# Patient Record
Sex: MALE | Race: WHITE | Employment: FULL TIME
[De-identification: names, ages, dates, MRNs, and addresses within clinical notes are randomized per-mention and may not be internally consistent; named-entity substitution may affect disease eponyms.]

---

## 2022-09-26 ASSESSMENT — ENCOUNTER SYMPTOMS
NAUSEA: 0
SHORTNESS OF BREATH: 0
VOMITING: 0
ABDOMINAL PAIN: 0
COUGH: 0
DIARRHEA: 0

## 2022-09-26 NOTE — PROGRESS NOTES
primary care provider. Notes a history of high blood pressure for the last five years. States that he has not been on medicine for the last two years. States that he previously took Lisinopril but states that it didn't work well for him. Also used to take Fluoxetine for PTSD. Would like to restart the Fluoxetine as it helped him a lot. Does not want to restart the Prazosin that was also prescribed at the time. Notes some chronic pain in his low back. States that a few years ago he picked up something at work when he heard a snapping sound and had instant pain. States that he developed numbness and tingling and lost the ability to stand. States that he went to an internal medicine physician, then an orthopedic, and was told that he crushed the disc in his low back. States that it improved with ice and heat and he was able to walk so did not pursue surgery at the time. Notes daily pain in his low back. Takes Ibuprofen for pain if needed. Notes pain in his right leg with prolonged walking. States that he has left-sided numbness after a hard fall back in 2015. States that he rates his low back pain at 3/10 right now. Can see Dr. Negrito Zhu if pain gets worse as he is a family friend. Also notes that he was previously diagnosed with type II diabetes. Has never taken medication for it before and would like to try continuous glucose monitoring if necessary. Is not fasting today but agrees to come next week to get labs drawn.       PAST MEDICAL HISTORY    Past Medical History:   Diagnosis Date    ADHD (attention deficit hyperactivity disorder)     Anxiety     BP (high blood pressure)     PTSD (post-traumatic stress disorder) 01/28/2019       PAST SURGICAL HISTORY    Past Surgical History:   Procedure Laterality Date    ADENOIDECTOMY  1995    TONSILLECTOMY      2000    TYMPANOSTOMY TUBE PLACEMENT Bilateral 1995       FAMILY HISTORY    Family History   Problem Relation Age of Onset    Heart Failure Mother     Diabetes type 2  Mother     Hypertension Mother     Bipolar Disorder Mother     Alcohol Abuse Father     Arthritis Father     COPD Father     Depression Father     Hearing Loss Father        SOCIAL HISTORY    Social History     Socioeconomic History    Marital status:      Spouse name: None    Number of children: None    Years of education: None    Highest education level: None   Tobacco Use    Smoking status: Never    Smokeless tobacco: Never   Substance and Sexual Activity    Drug use: Never    Sexual activity: Yes     Partners: Female       MEDICATIONS      Current Outpatient Medications:     FLUoxetine (PROZAC) 20 MG capsule, Take 1 capsule by mouth daily, Disp: 90 capsule, Rfl: 5    ALLERGIES / INTOLERANCES    No Known Allergies    REVIEW OF SYSTEMS    Review of Systems   Constitutional:  Negative for fever. HENT:  Negative for congestion. Respiratory:  Negative for cough and shortness of breath. Cardiovascular:  Negative for chest pain. Gastrointestinal:  Negative for abdominal pain, diarrhea, nausea and vomiting. Psychiatric/Behavioral:  Negative for dysphoric mood. PHYSICAL EXAMINATION    Vitals:    09/28/22 1355   BP: (!) 140/78   Pulse: 90   Resp: 16   SpO2: 99%       Physical Exam  Vitals and nursing note reviewed. Constitutional:       Appearance: Normal appearance. He is obese. HENT:      Head: Normocephalic and atraumatic. Right Ear: External ear normal.      Left Ear: External ear normal.      Nose: Nose normal.      Mouth/Throat:      Mouth: Mucous membranes are moist.   Eyes:      Extraocular Movements: Extraocular movements intact. Cardiovascular:      Rate and Rhythm: Normal rate and regular rhythm. Heart sounds: Normal heart sounds. No murmur heard. Pulmonary:      Effort: Pulmonary effort is normal. No respiratory distress. Breath sounds: Normal breath sounds.    Abdominal:      General: Bowel sounds are normal.      Palpations: Abdomen is soft. Tenderness: There is no abdominal tenderness. There is no right CVA tenderness or left CVA tenderness. Musculoskeletal:         General: Normal range of motion. Cervical back: Normal range of motion. Skin:     General: Skin is warm. Neurological:      General: No focal deficit present. Mental Status: He is alert.    Psychiatric:         Mood and Affect: Mood normal.         Behavior: Behavior normal.       PERTINENT LABS AND IMAGING    Hemoglobin A1C :  10.6 H (3/05/2021)    Toby Santamaria DO  3:45 PM  09/28/22

## 2022-09-28 ENCOUNTER — OFFICE VISIT (OUTPATIENT)
Dept: PRIMARY CARE CLINIC | Facility: CLINIC | Age: 27
End: 2022-09-28
Payer: COMMERCIAL

## 2022-09-28 VITALS
SYSTOLIC BLOOD PRESSURE: 140 MMHG | RESPIRATION RATE: 16 BRPM | HEIGHT: 72 IN | DIASTOLIC BLOOD PRESSURE: 78 MMHG | OXYGEN SATURATION: 99 % | WEIGHT: 284.5 LBS | BODY MASS INDEX: 38.54 KG/M2 | HEART RATE: 90 BPM

## 2022-09-28 DIAGNOSIS — Z76.89 ENCOUNTER TO ESTABLISH CARE WITH NEW DOCTOR: Primary | ICD-10-CM

## 2022-09-28 DIAGNOSIS — E11.9 TYPE 2 DIABETES MELLITUS WITHOUT COMPLICATION, WITHOUT LONG-TERM CURRENT USE OF INSULIN (HCC): ICD-10-CM

## 2022-09-28 DIAGNOSIS — Z13.29 SCREENING FOR THYROID DISORDER: ICD-10-CM

## 2022-09-28 DIAGNOSIS — F43.10 PTSD (POST-TRAUMATIC STRESS DISORDER): ICD-10-CM

## 2022-09-28 DIAGNOSIS — Z13.220 SCREENING FOR LIPID DISORDERS: ICD-10-CM

## 2022-09-28 DIAGNOSIS — I10 ELEVATED BLOOD PRESSURE READING IN OFFICE WITH DIAGNOSIS OF HYPERTENSION: ICD-10-CM

## 2022-09-28 PROBLEM — F33.2 MAJOR DEPRESSIVE DISORDER, RECURRENT SEVERE WITHOUT PSYCHOTIC FEATURES (HCC): Status: ACTIVE | Noted: 2019-01-29

## 2022-09-28 PROBLEM — R45.851 SUICIDAL IDEATION: Status: ACTIVE | Noted: 2019-01-28

## 2022-09-28 PROBLEM — R03.0 ELEVATED BLOOD PRESSURE READING: Status: ACTIVE | Noted: 2022-09-28

## 2022-09-28 PROCEDURE — 99204 OFFICE O/P NEW MOD 45 MIN: CPT | Performed by: FAMILY MEDICINE

## 2022-09-28 RX ORDER — FLUOXETINE HYDROCHLORIDE 20 MG/1
20 CAPSULE ORAL DAILY
COMMUNITY
Start: 2019-02-04 | End: 2022-09-28

## 2022-09-28 RX ORDER — FLUOXETINE HYDROCHLORIDE 20 MG/1
20 CAPSULE ORAL DAILY
Qty: 90 CAPSULE | Refills: 5 | Status: SHIPPED | OUTPATIENT
Start: 2022-09-28

## 2022-09-28 RX ORDER — LISINOPRIL 20 MG/1
20 TABLET ORAL DAILY
COMMUNITY
Start: 2019-02-04 | End: 2022-09-28 | Stop reason: ALTCHOICE

## 2022-09-28 ASSESSMENT — ANXIETY QUESTIONNAIRES
5. BEING SO RESTLESS THAT IT IS HARD TO SIT STILL: 0
3. WORRYING TOO MUCH ABOUT DIFFERENT THINGS: 0
IF YOU CHECKED OFF ANY PROBLEMS ON THIS QUESTIONNAIRE, HOW DIFFICULT HAVE THESE PROBLEMS MADE IT FOR YOU TO DO YOUR WORK, TAKE CARE OF THINGS AT HOME, OR GET ALONG WITH OTHER PEOPLE: NOT DIFFICULT AT ALL
4. TROUBLE RELAXING: 0
GAD7 TOTAL SCORE: 0
7. FEELING AFRAID AS IF SOMETHING AWFUL MIGHT HAPPEN: 0
1. FEELING NERVOUS, ANXIOUS, OR ON EDGE: 0
2. NOT BEING ABLE TO STOP OR CONTROL WORRYING: 0
6. BECOMING EASILY ANNOYED OR IRRITABLE: 0

## 2022-09-28 ASSESSMENT — PATIENT HEALTH QUESTIONNAIRE - PHQ9
9. THOUGHTS THAT YOU WOULD BE BETTER OFF DEAD, OR OF HURTING YOURSELF: 0
4. FEELING TIRED OR HAVING LITTLE ENERGY: 0
SUM OF ALL RESPONSES TO PHQ QUESTIONS 1-9: 0
8. MOVING OR SPEAKING SO SLOWLY THAT OTHER PEOPLE COULD HAVE NOTICED. OR THE OPPOSITE, BEING SO FIGETY OR RESTLESS THAT YOU HAVE BEEN MOVING AROUND A LOT MORE THAN USUAL: 0
5. POOR APPETITE OR OVEREATING: 0
SUM OF ALL RESPONSES TO PHQ QUESTIONS 1-9: 0
SUM OF ALL RESPONSES TO PHQ9 QUESTIONS 1 & 2: 0
7. TROUBLE CONCENTRATING ON THINGS, SUCH AS READING THE NEWSPAPER OR WATCHING TELEVISION: 0
SUM OF ALL RESPONSES TO PHQ QUESTIONS 1-9: 0
2. FEELING DOWN, DEPRESSED OR HOPELESS: 0
10. IF YOU CHECKED OFF ANY PROBLEMS, HOW DIFFICULT HAVE THESE PROBLEMS MADE IT FOR YOU TO DO YOUR WORK, TAKE CARE OF THINGS AT HOME, OR GET ALONG WITH OTHER PEOPLE: 0
1. LITTLE INTEREST OR PLEASURE IN DOING THINGS: 0
6. FEELING BAD ABOUT YOURSELF - OR THAT YOU ARE A FAILURE OR HAVE LET YOURSELF OR YOUR FAMILY DOWN: 0
3. TROUBLE FALLING OR STAYING ASLEEP: 0
SUM OF ALL RESPONSES TO PHQ QUESTIONS 1-9: 0

## 2022-09-28 NOTE — PATIENT INSTRUCTIONS
GREAT TO MEET YOU TODAY! I HAVE SENT A REFILL OF FLUOXETINE TO THE PHARMACY FOR YOU TO RESTART. WE WILL SEE HOW YOU ARE DOING IN A MONTH. PLEASE COME NEXT WEEK TO HAVE LABS DRAWN.  COME FASTING (NOTHING TO EAT OR DRINK AFTER MIDNIGHT THE NIGHT BEFORE). WE WILL DISCUSS THESE WHEN YOU RETURN IN 4 WEEKS. PLEASE WORK ON DIET - EAT MORE LEAN PROTEINS (CHICKEN, FISH, TURKEY), FRUITS, VEGETABLES AND DRINK MORE WATER. EAT LESS RED MEAT, DAIRY PRODUCTS, STARCHY FOODS (POTATOES, RICE, PASTA, BREAD), SWEETS AND DRINK LESS SODA AND SWEET TEA. Please try to do some form of aerobic exercise at least 3-4 times per week for about 20-30 minutes at a time. Aerobic exercise can include walking, hiking, jogging, swimming or using an elliptical machine.     WE WILL SEE YOU IN ONE MONTH BUT CALL WITH CONCERNS [phone]

## 2022-09-28 NOTE — ASSESSMENT & PLAN NOTE
Patient with history of diabetes with an elevated A1C last year of 10. Is not taking medicine, has been working on diet. Will return for labs next week and we will discuss these in a month. Based on his labs he wishes to try Dexcom for continuous glucose monitoring. Has never taken medication for this before, will consider oral medication prior to getting to injections. Yes

## 2022-09-28 NOTE — ASSESSMENT & PLAN NOTE
History of hypertension, patient states that he can usually control it himself without the Lisinopril that he used to take. Recheck today 142/80. Plan to recheck this in one month and to discuss labs at that time.

## 2022-09-28 NOTE — ASSESSMENT & PLAN NOTE
Patient used to take Prozac for PTSD, notes that it helped when he was on it but ran out. Refill sent to the pharmacy. Will recheck in 1 month.

## 2022-10-06 DIAGNOSIS — Z13.29 SCREENING FOR THYROID DISORDER: ICD-10-CM

## 2022-10-06 DIAGNOSIS — Z13.220 SCREENING FOR LIPID DISORDERS: ICD-10-CM

## 2022-10-06 DIAGNOSIS — I10 ELEVATED BLOOD PRESSURE READING IN OFFICE WITH DIAGNOSIS OF HYPERTENSION: ICD-10-CM

## 2022-10-06 DIAGNOSIS — E11.65 TYPE 2 DIABETES MELLITUS WITH HYPERGLYCEMIA, WITHOUT LONG-TERM CURRENT USE OF INSULIN (HCC): ICD-10-CM

## 2022-10-06 LAB
ALBUMIN SERPL-MCNC: 4 G/DL (ref 3.5–5)
ALBUMIN/GLOB SERPL: 1.3 {RATIO} (ref 1.2–3.5)
ALP SERPL-CCNC: 127 U/L (ref 50–136)
ALT SERPL-CCNC: 39 U/L (ref 12–65)
ANION GAP SERPL CALC-SCNC: 12 MMOL/L (ref 4–13)
AST SERPL-CCNC: 21 U/L (ref 15–37)
BASOPHILS # BLD: 0.1 K/UL (ref 0–0.2)
BASOPHILS NFR BLD: 1 % (ref 0–2)
BILIRUB SERPL-MCNC: 0.3 MG/DL (ref 0.2–1.1)
BUN SERPL-MCNC: 20 MG/DL (ref 6–23)
CALCIUM SERPL-MCNC: 9.2 MG/DL (ref 8.3–10.4)
CHLORIDE SERPL-SCNC: 105 MMOL/L (ref 101–110)
CHOLEST SERPL-MCNC: 179 MG/DL
CO2 SERPL-SCNC: 22 MMOL/L (ref 21–32)
CREAT SERPL-MCNC: 0.7 MG/DL (ref 0.8–1.5)
DIFFERENTIAL METHOD BLD: ABNORMAL
EOSINOPHIL # BLD: 0.1 K/UL (ref 0–0.8)
EOSINOPHIL NFR BLD: 1 % (ref 0.5–7.8)
ERYTHROCYTE [DISTWIDTH] IN BLOOD BY AUTOMATED COUNT: 12.7 % (ref 11.9–14.6)
EST. AVERAGE GLUCOSE BLD GHB EST-MCNC: 295 MG/DL
GLOBULIN SER CALC-MCNC: 3.2 G/DL (ref 2.3–3.5)
GLUCOSE SERPL-MCNC: 210 MG/DL (ref 65–100)
HBA1C MFR BLD: 11.9 % (ref 4.8–5.6)
HCT VFR BLD AUTO: 50.8 % (ref 41.1–50.3)
HDLC SERPL-MCNC: 34 MG/DL (ref 40–60)
HDLC SERPL: 5.3 {RATIO}
HGB BLD-MCNC: 16.2 G/DL (ref 13.6–17.2)
IMM GRANULOCYTES # BLD AUTO: 0.1 K/UL (ref 0–0.5)
IMM GRANULOCYTES NFR BLD AUTO: 1 % (ref 0–5)
LDLC SERPL CALC-MCNC: 82 MG/DL
LYMPHOCYTES # BLD: 3.4 K/UL (ref 0.5–4.6)
LYMPHOCYTES NFR BLD: 28 % (ref 13–44)
MCH RBC QN AUTO: 28.7 PG (ref 26.1–32.9)
MCHC RBC AUTO-ENTMCNC: 31.9 G/DL (ref 31.4–35)
MCV RBC AUTO: 89.9 FL (ref 79.6–97.8)
MONOCYTES # BLD: 0.7 K/UL (ref 0.1–1.3)
MONOCYTES NFR BLD: 6 % (ref 4–12)
NEUTS SEG # BLD: 7.9 K/UL (ref 1.7–8.2)
NEUTS SEG NFR BLD: 63 % (ref 43–78)
NRBC # BLD: 0 K/UL (ref 0–0.2)
PLATELET # BLD AUTO: 267 K/UL (ref 150–450)
PMV BLD AUTO: 12.4 FL (ref 9.4–12.3)
POTASSIUM SERPL-SCNC: 3.9 MMOL/L (ref 3.5–5.1)
PROT SERPL-MCNC: 7.2 G/DL (ref 6.3–8.2)
RBC # BLD AUTO: 5.65 M/UL (ref 4.23–5.6)
SODIUM SERPL-SCNC: 139 MMOL/L (ref 136–145)
TRIGL SERPL-MCNC: 315 MG/DL (ref 35–150)
TSH, 3RD GENERATION: 2.36 UIU/ML (ref 0.36–3.74)
VLDLC SERPL CALC-MCNC: 63 MG/DL (ref 6–23)
WBC # BLD AUTO: 12.3 K/UL (ref 4.3–11.1)

## 2022-10-27 NOTE — TELEPHONE ENCOUNTER
Patient had to move back his appointment due to losing his insurance but will run out of his 3 mg Rybelsus tablets prior to this. Worried he won't get this covered since he lost his insurance. Per Sendy Blake as long as he downloads the coupon from the website his prescription will be $10.  Will send the next dose of Rybelsus 7 mg to the pharmacy for patient to start when he finishes the 3 mg dose.

## 2022-11-10 DIAGNOSIS — E11.9 TYPE 2 DIABETES MELLITUS WITHOUT COMPLICATION, WITHOUT LONG-TERM CURRENT USE OF INSULIN (HCC): Primary | ICD-10-CM

## 2022-11-15 ENCOUNTER — APPOINTMENT (OUTPATIENT)
Dept: GENERAL RADIOLOGY | Age: 27
End: 2022-11-15

## 2022-11-15 ENCOUNTER — HOSPITAL ENCOUNTER (EMERGENCY)
Dept: CT IMAGING | Age: 27
Discharge: HOME | End: 2022-11-18

## 2022-11-15 ENCOUNTER — HOSPITAL ENCOUNTER (EMERGENCY)
Age: 27
Discharge: HOME | End: 2022-11-15
Attending: EMERGENCY MEDICINE | Admitting: EMERGENCY MEDICINE

## 2022-11-15 ENCOUNTER — HOSPITAL ENCOUNTER (EMERGENCY)
Dept: GENERAL RADIOLOGY | Age: 27
Discharge: HOME | End: 2022-11-18

## 2022-11-15 VITALS
WEIGHT: 286 LBS | BODY MASS INDEX: 38.74 KG/M2 | RESPIRATION RATE: 18 BRPM | TEMPERATURE: 98.5 F | OXYGEN SATURATION: 96 % | HEART RATE: 78 BPM | DIASTOLIC BLOOD PRESSURE: 87 MMHG | SYSTOLIC BLOOD PRESSURE: 136 MMHG | HEIGHT: 72 IN
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
## 2022-11-15 DIAGNOSIS — S09.90XA CLOSED HEAD INJURY, INITIAL ENCOUNTER: ICD-10-CM

## 2022-11-15 DIAGNOSIS — M54.50 ACUTE RIGHT-SIDED LOW BACK PAIN WITHOUT SCIATICA: ICD-10-CM

## 2022-11-15 PROCEDURE — 99284 EMERGENCY DEPT VISIT MOD MDM: CPT

## 2022-11-15 PROCEDURE — 6370000000 HC RX 637 (ALT 250 FOR IP): Performed by: PHYSICIAN ASSISTANT

## 2022-11-15 PROCEDURE — 73502 X-RAY EXAM HIP UNI 2-3 VIEWS: CPT

## 2022-11-15 PROCEDURE — 72125 CT NECK SPINE W/O DYE: CPT

## 2022-11-15 PROCEDURE — 73562 X-RAY EXAM OF KNEE 3: CPT

## 2022-11-15 PROCEDURE — 72100 X-RAY EXAM L-S SPINE 2/3 VWS: CPT

## 2022-11-15 PROCEDURE — 70450 CT HEAD/BRAIN W/O DYE: CPT

## 2022-11-15 RX ORDER — IBUPROFEN 800 MG/1
800 TABLET ORAL
Status: COMPLETED | OUTPATIENT
Start: 2022-11-15 | End: 2022-11-15

## 2022-11-15 RX ADMIN — IBUPROFEN 800 MG: 800 TABLET, FILM COATED ORAL at 20:29

## 2022-11-15 ASSESSMENT — PAIN SCALES - GENERAL
PAINLEVEL_OUTOF10: 7
PAINLEVEL_OUTOF10: 7

## 2022-11-15 ASSESSMENT — ENCOUNTER SYMPTOMS
ABDOMINAL PAIN: 0
VOMITING: 0
SHORTNESS OF BREATH: 0
BACK PAIN: 1
FACIAL SWELLING: 0
NAUSEA: 0

## 2022-11-15 ASSESSMENT — PAIN DESCRIPTION - LOCATION: LOCATION: HEAD

## 2022-11-15 ASSESSMENT — PAIN - FUNCTIONAL ASSESSMENT: PAIN_FUNCTIONAL_ASSESSMENT: 0-10

## 2022-11-15 NOTE — ED TRIAGE NOTES
Pt was at work and was pushing carts, pt states the cart got away from him and he tripped and hit his head on metal cart stand. Pt now with pain to to head, right knee, right hip and leg. Pt denies LOC, denies blood thinners.

## 2022-11-15 NOTE — ED NOTES
Pt here by EMS, pt had slip and fall in a [employer] parking lot. Complains of lower back pain, right hip, and right knee pain. Pt requested C Collar but denies neck pain. No LOC.      Vikram Ferrell RN  11/15/22 1414

## 2022-11-15 NOTE — Clinical Note
Renetta Cordova was seen and treated in our emergency department on 11/15/2022. He may return to work on 11/16/2022. If you have any questions or concerns, please don't hesitate to call.       Camilo Russell

## 2022-11-16 ENCOUNTER — TELEPHONE (OUTPATIENT)
Dept: PRIMARY CARE CLINIC | Facility: CLINIC | Age: 27
End: 2022-11-16

## 2022-11-16 NOTE — DISCHARGE INSTRUCTIONS
Your CT scans and x-rays are all reassuring today. Alternate tylenol and motrin as needed for pain. You can take tylenol every 4 hours as needed. You can take ibuprofen every 6 hours as needed. Follow-up with your PCP in 1 to 2 days if no improvement. Return to the ER for any new or worsening symptoms.

## 2022-11-16 NOTE — ED NOTES
I have reviewed discharge instructions with the patient. The patient verbalized understanding. Patient left ED via Discharge Method: ambulatory to Home with family. Opportunity for questions and clarification provided. Patient given 0 scripts. To continue your aftercare when you leave the hospital, you may receive an automated call from our care team to check in on how you are doing. This is a free service and part of our promise to provide the best care and service to meet your aftercare needs.  If you have questions, or wish to unsubscribe from this service please call [phone]. Thank you for Choosing our [facility] Emergency Department.       Katheryn Ellis RN  11/15/22 8639

## 2022-11-16 NOTE — TELEPHONE ENCOUNTER
Pt called about hospital visit after his fall. Pt asked if he needed to be seen    Verbally discussed  with Dr Martina Duncan. Pt was informed that he should rest for the next few days. Use ice for swelling and take ibuprofen PRN. Pt was asked to call Monday if there is no improvement. Pt agreed and verbalized understanding.

## 2022-11-16 NOTE — ED PROVIDER NOTES
Emergency Department Provider Note                   PCP:                Seda Schwartz DO               Age: 32 y.o. Sex: male       ICD-10-CM    1. Fall on same level from slipping, tripping or stumbling, initial encounter  W01. 0XXA       2. Closed head injury, initial encounter  S09.90XA       3. Acute right-sided low back pain without sciatica  M54.50           DISPOSITION Decision To Discharge 11/15/2022 08:36:10 PM        MDM  Number of Diagnoses or Management Options  Acute right-sided low back pain without sciatica  Closed head injury, initial encounter  Fall on same level from slipping, tripping or stumbling, initial encounter  Diagnosis management comments: Overall patient is a well-appearing 80-year-old male who presented today status post mechanical fall while at work. He presents to the emergency department in a c-collar. Denies having any neck pain currently, no midline spine tenderness on exam.  He has no neurologic deficits or other obvious abnormalities on examination. X-rays and CT imaging reassuring today. Discussed symptomatic treatment at home with Tylenol or ibuprofen, patient can follow-up with primary care, will return for any new or worsening symptoms. Amount and/or Complexity of Data Reviewed  Tests in the radiology section of CPT®: ordered and reviewed  Tests in the medicine section of CPT®: ordered    Patient Progress  Patient progress: improved       ED Course as of 11/15/22 2114   Tue Nov 15, 2022   8916 FINDINGS:  The lumbar vertebrae are normal in height and alignment. There are no  visible pars defects. IMPRESSION:  No acute findings in the lumbar spine. [KE]   1750    RIGHT HIP SERIES: The femoral heads are symmetric in contour and density. There  is no displaced fracture or dislocation. RIGHT KNEE SERIES: There is no displaced fracture or malalignment. Soft tissues  are normal.  Mild osteoarthritis is present in the medial compartment. IMPRESSION:  No displaced fracture. [KE]   2025 FINDINGS: There is no acute intracranial hemorrhage, significant mass effect or  CT evidence of acute large artery territorial infarction. Please note that a  hyperacute infarct or small vessel infarct may not be apparent on initial CT  imaging. There is no hydrocephalus , intra-axial mass or abnormal extra-axial fluid  collection. There are no displaced skull fractures. The mastoid air cells and  paranasal sinuses are clear where imaged. IMPRESSION:  No acute intracranial hemorrhage. [KE]   2025 FINDINGS:  The cervical vertebrae are normal in height and alignment. There is  no prevertebral soft tissue swelling. The articular facets overlap  appropriately. Axial images demonstrate no displaced cervical spine fracture. Included portions of the lung apices are clear. IMPRESSION:  No displaced cervical spine fracture. [KE]      ED Course User Index  [KE] ELENI Carrillo        Orders Placed This Encounter   Procedures    XR HIP RIGHT (2-3 VIEWS)    XR KNEE RIGHT (3 VIEWS)    XR LUMBAR SPINE (2-3 VIEWS)    CT HEAD WO CONTRAST    CT CERVICAL SPINE WO CONTRAST    Misc nursing order (specify)        Medications   ibuprofen (ADVIL;MOTRIN) tablet 800 mg (800 mg Oral Given 11/15/22 2029)       Discharge Medication List as of 11/15/2022  8:26 PM           Kaley Paris is a 32 y.o. male who presents to the Emergency Department with chief complaint of    Chief Complaint   Patient presents with    Fall      59-year-old male presents to the emergency department today for complaints of headache, right lower back pain, right hip pain, right knee pain status post mechanical fall. He states he was at work today at [employer] and he was pushing carts through the parking lot when he lost control of the carts and he ended up slipping and falling forward. He states that he believes he hit his head, reports some left forehead pain.   Denies any blurry vision, nausea or vomiting, does not believe he lost consciousness. He states that he had some mild neck pain that is since resolved, but he was placed in a c-collar prior to arrival here in the ER. He denies any pain in his chest or abdomen, no shortness of breath, no pain in the upper extremities. No bowel or bladder dysfunction. No numbness, tingling or weakness in his legs. The history is provided by the patient. No  was used. Review of Systems   Constitutional:  Negative for activity change and fever. HENT:  Negative for facial swelling and nosebleeds. Eyes:  Negative for visual disturbance. Respiratory:  Negative for shortness of breath. Cardiovascular:  Negative for chest pain. Gastrointestinal:  Negative for abdominal pain, nausea and vomiting. Genitourinary:  Negative for dysuria. Musculoskeletal:  Positive for back pain. Negative for joint swelling. Knee pain, hip pain   Skin:  Negative for wound. Neurological:  Negative for dizziness and headaches.      Past Medical History:   Diagnosis Date    ADHD (attention deficit hyperactivity disorder)     Anxiety     BP (high blood pressure)     PTSD (post-traumatic stress disorder) 01/28/2019        Past Surgical History:   Procedure Laterality Date    ADENOIDECTOMY  1995    TONSILLECTOMY      2000    TYMPANOSTOMY TUBE PLACEMENT Bilateral 1995        Family History   Problem Relation Age of Onset    Heart Failure Mother     Diabetes type 2  Mother     Hypertension Mother     Bipolar Disorder Mother     Alcohol Abuse Father     Arthritis Father     COPD Father     Depression Father     Hearing Loss Father         Social History     Socioeconomic History    Marital status:      Spouse name: None    Number of children: None    Years of education: None    Highest education level: None   Tobacco Use    Smoking status: Never    Smokeless tobacco: Never   Substance and Sexual Activity    Drug use: Never    Sexual activity: Yes     Partners: Female         Patient has no known allergies. Discharge Medication List as of 11/15/2022  8:26 PM        CONTINUE these medications which have NOT CHANGED    Details   Semaglutide 7 MG TABS Take 7 mg by mouth daily, Disp-30 tablet, R-5Normal      FLUoxetine (PROZAC) 20 MG capsule Take 1 capsule by mouth daily, Disp-90 capsule, R-5Normal              Vitals signs and nursing note reviewed. Patient Vitals for the past 4 hrs:   Temp Pulse Resp BP SpO2   11/15/22 1717 98.5 °F (36.9 °C) 78 18 136/87 96 %          Physical Exam  Vitals and nursing note reviewed. Constitutional:       General: He is not in acute distress. Appearance: Normal appearance. HENT:      Head: Normocephalic and atraumatic. Nose: Nose normal.      Mouth/Throat:      Mouth: Mucous membranes are moist.      Pharynx: Oropharynx is clear. Eyes:      Extraocular Movements: Extraocular movements intact. Conjunctiva/sclera: Conjunctivae normal.      Pupils: Pupils are equal, round, and reactive to light. Cardiovascular:      Rate and Rhythm: Normal rate and regular rhythm. Heart sounds: No murmur heard. No friction rub. No gallop. Pulmonary:      Effort: Pulmonary effort is normal.      Breath sounds: No wheezing, rhonchi or rales. Musculoskeletal:      Cervical back: Normal range of motion. Lumbar back: Tenderness present. No bony tenderness. Normal range of motion. Negative right straight leg raise test and negative left straight leg raise test.        Back:       Right hip: Tenderness present. No bony tenderness. Normal range of motion. Right knee: No swelling. Normal range of motion. Tenderness present. Right lower leg: Normal. No swelling. Right ankle: Normal pulse. Right foot: Normal pulse. Legs:    Skin:     General: Skin is warm and dry. Capillary Refill: Capillary refill takes less than 2 seconds.    Neurological:      General: No focal deficit present. Mental Status: He is alert and oriented to person, place, and time. Sensory: No sensory deficit. Motor: No weakness. Gait: Gait normal.   Psychiatric:         Mood and Affect: Mood normal.         Thought Content: Thought content normal.         Judgment: Judgment normal.        Procedures    Results for orders placed or performed during the hospital encounter of 11/15/22   XR HIP RIGHT (2-3 VIEWS)    Narrative    History: Pain after fall    RIGHT HIP SERIES: The femoral heads are symmetric in contour and density. There  is no displaced fracture or dislocation. RIGHT KNEE SERIES: There is no displaced fracture or malalignment. Soft tissues  are normal.  Mild osteoarthritis is present in the medial compartment. Impression    No displaced fracture. XR KNEE RIGHT (3 VIEWS)    Narrative    History: Pain after fall    RIGHT HIP SERIES: The femoral heads are symmetric in contour and density. There  is no displaced fracture or dislocation. RIGHT KNEE SERIES: There is no displaced fracture or malalignment. Soft tissues  are normal.  Mild osteoarthritis is present in the medial compartment. Impression    No displaced fracture. XR LUMBAR SPINE (2-3 VIEWS)    Narrative    LUMBAR SPINE AP AND LATERAL VIEWS    HISTORY:  back pain;    COMPARISON:  None    FINDINGS:  The lumbar vertebrae are normal in height and alignment. There are no  visible pars defects. Impression    No acute findings in the lumbar spine. CT HEAD WO CONTRAST    Narrative    HEAD CT WITHOUT CONTRAST  11/15/2022     HISTORY:   head injury, headache    TECHNIQUE: Noncontrast axial images were obtained through the brain. All CT  scans at this facility used dose modulation, interactive reconstruction and/or  weight based dosing when appropriate to reduce radiation dose to as low as  reasonably achievable.     COMPARISON: None    FINDINGS: There is no acute intracranial hemorrhage, significant mass effect or  CT evidence of acute large artery territorial infarction. Please note that a  hyperacute infarct or small vessel infarct may not be apparent on initial CT  imaging. There is no hydrocephalus , intra-axial mass or abnormal extra-axial fluid  collection. There are no displaced skull fractures. The mastoid air cells and  paranasal sinuses are clear where imaged. Impression    No acute intracranial hemorrhage. CT CERVICAL SPINE WO CONTRAST    Narrative    CT CERVICAL SPINE WITHOUT CONTRAST    HISTORY:  neck pain; fall with injury to head. TECHNIQUE: Noncontrast axial images were obtained from the craniocervical  junction through T1-T2. Multiplanar reformatted images were generated. All  CT scans at this facility used dose modulation, iterative reconstruction and/or  weight based dosing when appropriate to reduce radiation dose to as low as  reasonably achievable. COMPARISON:  None    FINDINGS:  The cervical vertebrae are normal in height and alignment. There is  no prevertebral soft tissue swelling. The articular facets overlap  appropriately. Axial images demonstrate no displaced cervical spine fracture. Included portions of the lung apices are clear. Impression    No displaced cervical spine fracture. CT HEAD WO CONTRAST   Final Result   No acute intracranial hemorrhage. CT CERVICAL SPINE WO CONTRAST   Final Result   No displaced cervical spine fracture. XR LUMBAR SPINE (2-3 VIEWS)   Final Result   No acute findings in the lumbar spine. XR HIP RIGHT (2-3 VIEWS)   Final Result   No displaced fracture. XR KNEE RIGHT (3 VIEWS)   Final Result   No displaced fracture. Voice dictation software was used during the making of this note. This software is not perfect and grammatical and other typographical errors may be present. This note has not been completely proofread for errors.         Luis Armando, [id number]  11/15/22 8304

## 2023-04-05 ENCOUNTER — APPOINTMENT (OUTPATIENT)
Dept: GENERAL RADIOLOGY | Age: 28
End: 2023-04-05

## 2023-04-05 ENCOUNTER — HOSPITAL ENCOUNTER (EMERGENCY)
Age: 28
Discharge: HOME | End: 2023-04-06
Attending: EMERGENCY MEDICINE

## 2023-04-05 DIAGNOSIS — R73.9 HYPERGLYCEMIA: ICD-10-CM

## 2023-04-05 DIAGNOSIS — R55 SYNCOPE AND COLLAPSE: Primary | ICD-10-CM

## 2023-04-05 PROCEDURE — 71045 X-RAY EXAM CHEST 1 VIEW: CPT

## 2023-04-05 RX ORDER — KETOROLAC TROMETHAMINE 15 MG/ML
15 INJECTION, SOLUTION INTRAMUSCULAR; INTRAVENOUS ONCE
Status: COMPLETED | OUTPATIENT
Start: 2023-04-05 | End: 2023-04-06

## 2023-04-05 RX ORDER — 0.9 % SODIUM CHLORIDE 0.9 %
1000 INTRAVENOUS SOLUTION INTRAVENOUS ONCE
Status: COMPLETED | OUTPATIENT
Start: 2023-04-05 | End: 2023-04-06

## 2023-04-05 ASSESSMENT — ENCOUNTER SYMPTOMS
NAUSEA: 0
VOMITING: 0

## 2023-04-05 ASSESSMENT — LIFESTYLE VARIABLES
HOW OFTEN DO YOU HAVE A DRINK CONTAINING ALCOHOL: MONTHLY OR LESS
HOW MANY STANDARD DRINKS CONTAINING ALCOHOL DO YOU HAVE ON A TYPICAL DAY: 1 OR 2

## 2023-04-05 ASSESSMENT — PAIN - FUNCTIONAL ASSESSMENT: PAIN_FUNCTIONAL_ASSESSMENT: NONE - DENIES PAIN

## 2023-04-05 NOTE — Clinical Note
Naveen Corbin was seen and treated in our emergency department on 4/5/2023. He may return to work on 04/09/2023. If you have any questions or concerns, please don't hesitate to call.       Solange Martinez MD

## 2023-04-06 VITALS
HEART RATE: 90 BPM | HEIGHT: 72 IN | BODY MASS INDEX: 39.48 KG/M2 | WEIGHT: 291.5 LBS | OXYGEN SATURATION: 94 % | DIASTOLIC BLOOD PRESSURE: 73 MMHG | RESPIRATION RATE: 23 BRPM | SYSTOLIC BLOOD PRESSURE: 126 MMHG | TEMPERATURE: 99 F

## 2023-04-06 LAB
ALBUMIN SERPL-MCNC: 3.7 G/DL (ref 3.5–5)
ALBUMIN/GLOB SERPL: 1 (ref 0.4–1.6)
ALP SERPL-CCNC: 119 U/L (ref 50–136)
ALT SERPL-CCNC: 37 U/L (ref 12–65)
ANION GAP SERPL CALC-SCNC: 5 MMOL/L (ref 2–11)
AST SERPL-CCNC: 17 U/L (ref 15–37)
BASOPHILS # BLD: 0.1 K/UL (ref 0–0.2)
BASOPHILS NFR BLD: 1 % (ref 0–2)
BILIRUB SERPL-MCNC: 0.3 MG/DL (ref 0.2–1.1)
BUN SERPL-MCNC: 13 MG/DL (ref 6–23)
CALCIUM SERPL-MCNC: 8.8 MG/DL (ref 8.3–10.4)
CHLORIDE SERPL-SCNC: 99 MMOL/L (ref 101–110)
CO2 SERPL-SCNC: 29 MMOL/L (ref 21–32)
CREAT SERPL-MCNC: 1.05 MG/DL (ref 0.8–1.5)
DIFFERENTIAL METHOD BLD: ABNORMAL
EKG ATRIAL RATE: 91 BPM
EKG DIAGNOSIS: NORMAL
EKG P AXIS: 41 DEGREES
EKG P-R INTERVAL: 178 MS
EKG Q-T INTERVAL: 353 MS
EKG QRS DURATION: 101 MS
EKG QTC CALCULATION (BAZETT): 435 MS
EKG R AXIS: 44 DEGREES
EKG T AXIS: 34 DEGREES
EKG VENTRICULAR RATE: 91 BPM
EOSINOPHIL # BLD: 0.1 K/UL (ref 0–0.8)
EOSINOPHIL NFR BLD: 1 % (ref 0.5–7.8)
ERYTHROCYTE [DISTWIDTH] IN BLOOD BY AUTOMATED COUNT: 12.6 % (ref 11.9–14.6)
GLOBULIN SER CALC-MCNC: 3.6 G/DL (ref 2.8–4.5)
GLUCOSE SERPL-MCNC: 422 MG/DL (ref 65–100)
HCT VFR BLD AUTO: 46.3 % (ref 41.1–50.3)
HGB BLD-MCNC: 15.4 G/DL (ref 13.6–17.2)
IMM GRANULOCYTES # BLD AUTO: 0.1 K/UL (ref 0–0.5)
IMM GRANULOCYTES NFR BLD AUTO: 1 % (ref 0–5)
LYMPHOCYTES # BLD: 3.5 K/UL (ref 0.5–4.6)
LYMPHOCYTES NFR BLD: 30 % (ref 13–44)
MCH RBC QN AUTO: 28.5 PG (ref 26.1–32.9)
MCHC RBC AUTO-ENTMCNC: 33.3 G/DL (ref 31.4–35)
MCV RBC AUTO: 85.6 FL (ref 82–102)
MONOCYTES # BLD: 0.7 K/UL (ref 0.1–1.3)
MONOCYTES NFR BLD: 6 % (ref 4–12)
NEUTS SEG # BLD: 7 K/UL (ref 1.7–8.2)
NEUTS SEG NFR BLD: 61 % (ref 43–78)
NRBC # BLD: 0 K/UL (ref 0–0.2)
PLATELET # BLD AUTO: 263 K/UL (ref 150–450)
PMV BLD AUTO: 11 FL (ref 9.4–12.3)
POTASSIUM SERPL-SCNC: 3.8 MMOL/L (ref 3.5–5.1)
PROT SERPL-MCNC: 7.3 G/DL (ref 6.3–8.2)
RBC # BLD AUTO: 5.41 M/UL (ref 4.23–5.6)
SODIUM SERPL-SCNC: 133 MMOL/L (ref 133–143)
TROPONIN I SERPL HS-MCNC: 6.3 PG/ML (ref 0–14)
WBC # BLD AUTO: 11.4 K/UL (ref 4.3–11.1)

## 2023-04-06 PROCEDURE — 93005 ELECTROCARDIOGRAM TRACING: CPT | Performed by: EMERGENCY MEDICINE

## 2023-04-06 PROCEDURE — 2580000003 HC RX 258: Performed by: EMERGENCY MEDICINE

## 2023-04-06 PROCEDURE — 84484 ASSAY OF TROPONIN QUANT: CPT

## 2023-04-06 PROCEDURE — 85025 COMPLETE CBC W/AUTO DIFF WBC: CPT

## 2023-04-06 PROCEDURE — 6360000002 HC RX W HCPCS: Performed by: EMERGENCY MEDICINE

## 2023-04-06 PROCEDURE — 80053 COMPREHEN METABOLIC PANEL: CPT

## 2023-04-06 RX ADMIN — SODIUM CHLORIDE 1000 ML: 9 INJECTION, SOLUTION INTRAVENOUS at 00:20

## 2023-04-06 RX ADMIN — KETOROLAC TROMETHAMINE 15 MG: 15 INJECTION, SOLUTION INTRAMUSCULAR; INTRAVENOUS at 00:20

## 2023-04-06 ASSESSMENT — ENCOUNTER SYMPTOMS
COUGH: 0
ABDOMINAL PAIN: 0
NAUSEA: 0
DIARRHEA: 0
VOMITING: 0
SHORTNESS OF BREATH: 0

## 2023-04-06 NOTE — ED TRIAGE NOTES
Patient passed out from heat around 5:30 this afternoon. He did not hit the floor he fell into a hyman. He said he did have chest pain earlier but now its just a dull ache.

## 2023-04-06 NOTE — ED NOTES
I have reviewed discharge instructions with the patient. The patient verbalized understanding. Patient left ED via Discharge Method: ambulatory to Home with spouse  Opportunity for questions and clarification provided. Patient given 0 scripts. Px sent to pharmacy         To continue your aftercare when you leave the hospital, you may receive an automated call from our care team to check in on how you are doing. This is a free service and part of our promise to provide the best care and service to meet your aftercare needs.  If you have questions, or wish to unsubscribe from this service please call [phone]. Thank you for Choosing our [facility] Emergency Department.         Elijah Cleaning RN  04/06/23 3733

## 2023-04-06 NOTE — PROGRESS NOTES
deficit present. Mental Status: He is alert. Psychiatric:         Mood and Affect: Mood normal.         Behavior: Behavior normal.           RESULTS    Lab Results   Component Value Date    WBC 11.4 (H) 04/06/2023    HGB 15.4 04/06/2023    HCT 46.3 04/06/2023    MCV 85.6 04/06/2023     04/06/2023     Lab Results   Component Value Date     04/06/2023    K 3.8 04/06/2023    CL 99 (L) 04/06/2023    CO2 29 04/06/2023    BUN 13 04/06/2023    CREATININE 1.05 04/06/2023    GLUCOSE 422 (H) 04/06/2023    CALCIUM 8.8 04/06/2023    PROT 7.3 04/06/2023    LABALBU 3.7 04/06/2023    BILITOT 0.3 04/06/2023    ALKPHOS 119 04/06/2023    AST 17 04/06/2023    ALT 37 04/06/2023    LABGLOM >60 04/06/2023    GLOB 3.6 04/06/2023     Troponin, High Sensitivity 0 - 14 pg/mL 6.3     Xray Result (most recent):  XR CHEST PORTABLE 04/05/2023    Narrative  EXAMINATION: FRONTAL VIEW OF THE CHEST    CLINICAL INDICATION: Syncope    COMPARISON: None. FINDINGS:    Lung volumes are low, crowding interstitium bilaterally. Cannot exclude mild  airspace opacities in the right lung base. No effusion or pneumothorax. Cardiac  silhouette is within normal limits for size, given low lung volume. Bones are  normal.    Impression  Limited evaluation with low lung volumes. Cannot exclude mild airspace opacities  in the right lung base. Thank you for the referral of this patient. This exam was interpreted by an  American Board of Radiology certified radiologist with subspecialty training. If  there are any questions regarding this exam please feel free to contact a  radiologist directly at [phone].       Slot: 70    Rekha Cleaning M.D.  4/6/2023 12:19:00 AM        Cash Snell DO  9:19 AM  04/07/23

## 2023-04-06 NOTE — ED PROVIDER NOTES
Syncope    COMPARISON: None. FINDINGS:     Lung volumes are low, crowding interstitium bilaterally. Cannot exclude mild   airspace opacities in the right lung base. No effusion or pneumothorax. Cardiac   silhouette is within normal limits for size, given low lung volume. Bones are   normal.      Impression    Limited evaluation with low lung volumes. Cannot exclude mild airspace opacities   in the right lung base. Thank you for the referral of this patient. This exam was interpreted by an   [employer] certified radiologist with subspecialty training. If   there are any questions regarding this exam please feel free to contact a   radiologist directly at [phone].       Slot: 70     Wendy Michele M.D.   4/6/2023 12:19:00 AM   CBC with Auto Differential   Result Value Ref Range    WBC 11.4 (H) 4.3 - 11.1 K/uL    RBC 5.41 4.23 - 5.6 M/uL    Hemoglobin 15.4 13.6 - 17.2 g/dL    Hematocrit 46.3 41.1 - 50.3 %    MCV 85.6 82.0 - 102.0 FL    MCH 28.5 26.1 - 32.9 PG    MCHC 33.3 31.4 - 35.0 g/dL    RDW 12.6 11.9 - 14.6 %    Platelets 887 243 - 071 K/uL    MPV 11.0 9.4 - 12.3 FL    nRBC 0.00 0.0 - 0.2 K/uL    Differential Type AUTOMATED      Seg Neutrophils 61 43 - 78 %    Lymphocytes 30 13 - 44 %    Monocytes 6 4.0 - 12.0 %    Eosinophils % 1 0.5 - 7.8 %    Basophils 1 0.0 - 2.0 %    Immature Granulocytes 1 0.0 - 5.0 %    Segs Absolute 7.0 1.7 - 8.2 K/UL    Absolute Lymph # 3.5 0.5 - 4.6 K/UL    Absolute Mono # 0.7 0.1 - 1.3 K/UL    Absolute Eos # 0.1 0.0 - 0.8 K/UL    Basophils Absolute 0.1 0.0 - 0.2 K/UL    Absolute Immature Granulocyte 0.1 0.0 - 0.5 K/UL   Comprehensive Metabolic Panel   Result Value Ref Range    Sodium 133 133 - 143 mmol/L    Potassium 3.8 3.5 - 5.1 mmol/L    Chloride 99 (L) 101 - 110 mmol/L    CO2 29 21 - 32 mmol/L    Anion Gap 5 2 - 11 mmol/L    Glucose 422 (H) 65 - 100 mg/dL    BUN 13 6 - 23 MG/DL    Creatinine 1.05 0.8 - 1.5 MG/DL    Est, Glom Filt Rate >60 >60

## 2023-04-07 ENCOUNTER — OFFICE VISIT (OUTPATIENT)
Dept: PRIMARY CARE CLINIC | Facility: CLINIC | Age: 28
End: 2023-04-07

## 2023-04-07 VITALS
OXYGEN SATURATION: 98 % | BODY MASS INDEX: 38.64 KG/M2 | SYSTOLIC BLOOD PRESSURE: 132 MMHG | HEIGHT: 72 IN | DIASTOLIC BLOOD PRESSURE: 84 MMHG | WEIGHT: 285.3 LBS | RESPIRATION RATE: 16 BRPM | HEART RATE: 68 BPM

## 2023-04-07 DIAGNOSIS — E11.65 TYPE 2 DIABETES MELLITUS WITH HYPERGLYCEMIA, WITHOUT LONG-TERM CURRENT USE OF INSULIN (HCC): ICD-10-CM

## 2023-04-07 DIAGNOSIS — R55 SYNCOPE, UNSPECIFIED SYNCOPE TYPE: Primary | ICD-10-CM

## 2023-04-07 PROCEDURE — 99214 OFFICE O/P EST MOD 30 MIN: CPT | Performed by: FAMILY MEDICINE

## 2023-04-07 PROCEDURE — 3075F SYST BP GE 130 - 139MM HG: CPT | Performed by: FAMILY MEDICINE

## 2023-04-07 PROCEDURE — 3079F DIAST BP 80-89 MM HG: CPT | Performed by: FAMILY MEDICINE

## 2023-04-07 RX ORDER — CYCLOBENZAPRINE HCL 10 MG
TABLET ORAL
COMMUNITY
Start: 2023-02-13

## 2023-04-07 SDOH — ECONOMIC STABILITY: HOUSING INSECURITY
IN THE LAST 12 MONTHS, WAS THERE A TIME WHEN YOU DID NOT HAVE A STEADY PLACE TO SLEEP OR SLEPT IN A SHELTER (INCLUDING NOW)?: NO

## 2023-04-07 SDOH — ECONOMIC STABILITY: FOOD INSECURITY: WITHIN THE PAST 12 MONTHS, THE FOOD YOU BOUGHT JUST DIDN'T LAST AND YOU DIDN'T HAVE MONEY TO GET MORE.: NEVER TRUE

## 2023-04-07 SDOH — ECONOMIC STABILITY: FOOD INSECURITY: WITHIN THE PAST 12 MONTHS, YOU WORRIED THAT YOUR FOOD WOULD RUN OUT BEFORE YOU GOT MONEY TO BUY MORE.: NEVER TRUE

## 2023-04-07 SDOH — ECONOMIC STABILITY: INCOME INSECURITY: HOW HARD IS IT FOR YOU TO PAY FOR THE VERY BASICS LIKE FOOD, HOUSING, MEDICAL CARE, AND HEATING?: NOT HARD AT ALL

## 2023-04-07 ASSESSMENT — PATIENT HEALTH QUESTIONNAIRE - PHQ9
SUM OF ALL RESPONSES TO PHQ QUESTIONS 1-9: 0
5. POOR APPETITE OR OVEREATING: 0
3. TROUBLE FALLING OR STAYING ASLEEP: 0
8. MOVING OR SPEAKING SO SLOWLY THAT OTHER PEOPLE COULD HAVE NOTICED. OR THE OPPOSITE, BEING SO FIGETY OR RESTLESS THAT YOU HAVE BEEN MOVING AROUND A LOT MORE THAN USUAL: 0
1. LITTLE INTEREST OR PLEASURE IN DOING THINGS: 0
SUM OF ALL RESPONSES TO PHQ QUESTIONS 1-9: 0
4. FEELING TIRED OR HAVING LITTLE ENERGY: 0
6. FEELING BAD ABOUT YOURSELF - OR THAT YOU ARE A FAILURE OR HAVE LET YOURSELF OR YOUR FAMILY DOWN: 0
9. THOUGHTS THAT YOU WOULD BE BETTER OFF DEAD, OR OF HURTING YOURSELF: 0
7. TROUBLE CONCENTRATING ON THINGS, SUCH AS READING THE NEWSPAPER OR WATCHING TELEVISION: 0
SUM OF ALL RESPONSES TO PHQ9 QUESTIONS 1 & 2: 0
SUM OF ALL RESPONSES TO PHQ QUESTIONS 1-9: 0
SUM OF ALL RESPONSES TO PHQ QUESTIONS 1-9: 0
2. FEELING DOWN, DEPRESSED OR HOPELESS: 0
10. IF YOU CHECKED OFF ANY PROBLEMS, HOW DIFFICULT HAVE THESE PROBLEMS MADE IT FOR YOU TO DO YOUR WORK, TAKE CARE OF THINGS AT HOME, OR GET ALONG WITH OTHER PEOPLE: 0

## 2023-04-07 ASSESSMENT — ANXIETY QUESTIONNAIRES
5. BEING SO RESTLESS THAT IT IS HARD TO SIT STILL: 0
6. BECOMING EASILY ANNOYED OR IRRITABLE: 0
GAD7 TOTAL SCORE: 0
3. WORRYING TOO MUCH ABOUT DIFFERENT THINGS: 0
1. FEELING NERVOUS, ANXIOUS, OR ON EDGE: 0
7. FEELING AFRAID AS IF SOMETHING AWFUL MIGHT HAPPEN: 0
4. TROUBLE RELAXING: 0
2. NOT BEING ABLE TO STOP OR CONTROL WORRYING: 0
IF YOU CHECKED OFF ANY PROBLEMS ON THIS QUESTIONNAIRE, HOW DIFFICULT HAVE THESE PROBLEMS MADE IT FOR YOU TO DO YOUR WORK, TAKE CARE OF THINGS AT HOME, OR GET ALONG WITH OTHER PEOPLE: NOT DIFFICULT AT ALL

## 2023-04-07 NOTE — LETTER
April 7, 2023       Mega Mckenna YOB: 1995   [address]  [address] Date of Visit:  4/7/2023       To Whom It May Concern: It is my medical opinion that Mega Mckenna may return to work on 4/10/2023 with the following restrictions: needs a 10-minute break every 3 hours due to a medical condition.       Sincerely,        Klaus Romo, DO

## 2023-04-07 NOTE — PATIENT INSTRUCTIONS
IT WAS GREAT TO SEE YOU TODAY! PLEASE TAKE ALL MEDICATION AS DISCUSSED.    ~FOR THE FIRST WEEK, TAKE HALF OF A METFORMIN IN THE MORNING WITH BREAKFAST. ~FOR THE SECOND WEEK, TAKE HALF OF A METFORMIN IN THE MORNING WITH BREAKFAST AND HALF OF A METFORMIN IN THE EVENING WITH DINNER.    ~FOR THE THIRD WEEK, TAKE A WHOLE METFORMIN PILL IN THE MORNING WITH BREAKFAST AND TAKE HALF A METFORMIN IN THE EVENING WITH DINNER.    ~STARTING THE FOURTH WEEK, TAKE A WHOLE METFORMIN IN THE MORNING WITH DINNER AND A WHOLE METFORMIN IN THE EVENING WITH DINNER.     I WILL SEE YOU AGAIN IN 3 WEEKS BUT PLEASE CALL WITH CONCERNS [phone]

## 2023-04-07 NOTE — ASSESSMENT & PLAN NOTE
Patient with elevated blood sugar of 422 mg/dL in the ER, known diabetes but has not been taking medicine due to losing his insurance. Given a prescription for Metformin but has not been taking it. Discussed slowly starting it to avoid diarrhea and the importance of working on diet - less soda, less regular Powerade, less [employer] cakes, etc.  Will recheck at his follow up in 3 weeks.

## 2023-04-07 NOTE — ASSESSMENT & PLAN NOTE
Patient presents for ER follow up after being evaluated for syncope and chest pain, workup only showed hyperglycemia. Patient improved with IV fluids. Discussed importance of hydration, given note for work allowing him to have a 10-minute break every 3-4 hours so that he can hydrate. Reviewed normal X-ray and EKG. Will continue to follow.

## 2023-04-20 ASSESSMENT — ENCOUNTER SYMPTOMS
VOMITING: 0
NAUSEA: 0
SHORTNESS OF BREATH: 0
DIARRHEA: 0
COUGH: 0
ABDOMINAL PAIN: 0

## 2023-04-20 NOTE — PROGRESS NOTES
Palpations: Abdomen is soft. Tenderness: There is no abdominal tenderness. There is no right CVA tenderness or left CVA tenderness. Musculoskeletal:         General: Normal range of motion. Cervical back: Normal range of motion. Skin:     General: Skin is warm. Neurological:      General: No focal deficit present. Mental Status: He is alert.    Psychiatric:         Mood and Affect: Mood normal.         Behavior: Behavior normal.           RESULTS    Lab Results   Component Value Date     04/06/2023    K 3.8 04/06/2023    CL 99 (L) 04/06/2023    CO2 29 04/06/2023    BUN 13 04/06/2023    CREATININE 1.05 04/06/2023    GLUCOSE 422 (H) 04/06/2023    CALCIUM 8.8 04/06/2023    PROT 7.3 04/06/2023    LABALBU 3.7 04/06/2023    BILITOT 0.3 04/06/2023    ALKPHOS 119 04/06/2023    AST 17 04/06/2023    ALT 37 04/06/2023    LABGLOM >60 04/06/2023    GLOB 3.6 04/06/2023       Lab Results   Component Value Date    WBC 11.4 (H) 04/06/2023    HGB 15.4 04/06/2023    HCT 46.3 04/06/2023    MCV 85.6 04/06/2023     04/06/2023     Hemoglobin A1C   Date Value Ref Range Status   10/06/2022 11.9 (H) 4.8 - 5.6 % Final     Lab Results   Component Value Date    LSG4ERO 2.360 10/06/2022     Lab Results   Component Value Date    CHOL 179 10/06/2022     Lab Results   Component Value Date    TRIG 315 (H) 10/06/2022     Lab Results   Component Value Date    HDL 34 (L) 10/06/2022     Lab Results   Component Value Date    LDLCALC 82 10/06/2022     Lab Results   Component Value Date    LABVLDL 63 (H) 10/06/2022     Lab Results   Component Value Date    CHOLHDLRATIO 5.3 10/06/2022         Lilia Pope DO  10:16 AM  04/21/23

## 2023-04-21 ENCOUNTER — OFFICE VISIT (OUTPATIENT)
Dept: PRIMARY CARE CLINIC | Facility: CLINIC | Age: 28
End: 2023-04-21

## 2023-04-21 VITALS
SYSTOLIC BLOOD PRESSURE: 128 MMHG | RESPIRATION RATE: 16 BRPM | HEIGHT: 72 IN | WEIGHT: 280.3 LBS | HEART RATE: 74 BPM | DIASTOLIC BLOOD PRESSURE: 72 MMHG | OXYGEN SATURATION: 98 % | BODY MASS INDEX: 37.96 KG/M2

## 2023-04-21 DIAGNOSIS — E11.65 TYPE 2 DIABETES MELLITUS WITH HYPERGLYCEMIA, WITHOUT LONG-TERM CURRENT USE OF INSULIN (HCC): Primary | ICD-10-CM

## 2023-04-21 DIAGNOSIS — F33.2 MAJOR DEPRESSIVE DISORDER, RECURRENT SEVERE WITHOUT PSYCHOTIC FEATURES (HCC): ICD-10-CM

## 2023-04-21 DIAGNOSIS — R55 SYNCOPE, UNSPECIFIED SYNCOPE TYPE: ICD-10-CM

## 2023-04-21 DIAGNOSIS — E11.65 TYPE 2 DIABETES MELLITUS WITH HYPERGLYCEMIA, WITHOUT LONG-TERM CURRENT USE OF INSULIN (HCC): ICD-10-CM

## 2023-04-21 LAB
CREAT UR-MCNC: 56 MG/DL
MICROALBUMIN UR-MCNC: 0.78 MG/DL (ref 0–3)
MICROALBUMIN/CREAT UR-RTO: 14 MG/G (ref 0–30)

## 2023-04-21 PROCEDURE — 99214 OFFICE O/P EST MOD 30 MIN: CPT | Performed by: FAMILY MEDICINE

## 2023-04-21 SDOH — ECONOMIC STABILITY: FOOD INSECURITY: WITHIN THE PAST 12 MONTHS, YOU WORRIED THAT YOUR FOOD WOULD RUN OUT BEFORE YOU GOT MONEY TO BUY MORE.: NEVER TRUE

## 2023-04-21 SDOH — ECONOMIC STABILITY: INCOME INSECURITY: HOW HARD IS IT FOR YOU TO PAY FOR THE VERY BASICS LIKE FOOD, HOUSING, MEDICAL CARE, AND HEATING?: NOT HARD AT ALL

## 2023-04-21 SDOH — ECONOMIC STABILITY: FOOD INSECURITY: WITHIN THE PAST 12 MONTHS, THE FOOD YOU BOUGHT JUST DIDN'T LAST AND YOU DIDN'T HAVE MONEY TO GET MORE.: NEVER TRUE

## 2023-04-21 ASSESSMENT — PATIENT HEALTH QUESTIONNAIRE - PHQ9
1. LITTLE INTEREST OR PLEASURE IN DOING THINGS: 0
6. FEELING BAD ABOUT YOURSELF - OR THAT YOU ARE A FAILURE OR HAVE LET YOURSELF OR YOUR FAMILY DOWN: 0
SUM OF ALL RESPONSES TO PHQ9 QUESTIONS 1 & 2: 0
SUM OF ALL RESPONSES TO PHQ QUESTIONS 1-9: 0
4. FEELING TIRED OR HAVING LITTLE ENERGY: 0
7. TROUBLE CONCENTRATING ON THINGS, SUCH AS READING THE NEWSPAPER OR WATCHING TELEVISION: 0
5. POOR APPETITE OR OVEREATING: 0
2. FEELING DOWN, DEPRESSED OR HOPELESS: 0
3. TROUBLE FALLING OR STAYING ASLEEP: 0
10. IF YOU CHECKED OFF ANY PROBLEMS, HOW DIFFICULT HAVE THESE PROBLEMS MADE IT FOR YOU TO DO YOUR WORK, TAKE CARE OF THINGS AT HOME, OR GET ALONG WITH OTHER PEOPLE: 0
9. THOUGHTS THAT YOU WOULD BE BETTER OFF DEAD, OR OF HURTING YOURSELF: 0
SUM OF ALL RESPONSES TO PHQ QUESTIONS 1-9: 0
8. MOVING OR SPEAKING SO SLOWLY THAT OTHER PEOPLE COULD HAVE NOTICED. OR THE OPPOSITE, BEING SO FIGETY OR RESTLESS THAT YOU HAVE BEEN MOVING AROUND A LOT MORE THAN USUAL: 0

## 2023-04-21 ASSESSMENT — ANXIETY QUESTIONNAIRES
3. WORRYING TOO MUCH ABOUT DIFFERENT THINGS: 0
GAD7 TOTAL SCORE: 0
5. BEING SO RESTLESS THAT IT IS HARD TO SIT STILL: 0
6. BECOMING EASILY ANNOYED OR IRRITABLE: 0
4. TROUBLE RELAXING: 0
IF YOU CHECKED OFF ANY PROBLEMS ON THIS QUESTIONNAIRE, HOW DIFFICULT HAVE THESE PROBLEMS MADE IT FOR YOU TO DO YOUR WORK, TAKE CARE OF THINGS AT HOME, OR GET ALONG WITH OTHER PEOPLE: NOT DIFFICULT AT ALL
7. FEELING AFRAID AS IF SOMETHING AWFUL MIGHT HAPPEN: 0
2. NOT BEING ABLE TO STOP OR CONTROL WORRYING: 0
1. FEELING NERVOUS, ANXIOUS, OR ON EDGE: 0

## 2023-04-21 NOTE — ASSESSMENT & PLAN NOTE
Hemoglobin A1C   Date Value Ref Range Status   10/06/2022 11.9 (H) 4.8 - 5.6 % Final     Last A1c 6 months ago was 11.9, patient was started on Rybelsus but lost his insurance and stopped taking the medicine. Had a syncopal episode few weeks ago, went to the ER and was found to have hyperglycemia. Started on metformin, which she has been taking but has missed a few doses, is still taking half a pill twice a day. Plan to continue to titrate up to 1 pill twice a day. We will recheck A1c and microalbumin today, holding another labs as he does not have insurance coverage yet. We will recheck in 3 months. Discussed importance of healthy lifestyle, both diet and exercise.

## 2023-04-21 NOTE — PATIENT INSTRUCTIONS
IT WAS GREAT TO SEE YOU TODAY! I WILL CALL YOU WITH THE RESULTS OF YOUR LABS. PLEASE TAKE ALL MEDICATION AS DISCUSSED.    ~CONTINUE WORKING UP ON YOUR METFORMIN DOSE. OUR GOAL IS TO GET TO 1000 MG (ONE WHOLE PILL) TWICE A DAY WITH MEALS. PLEASE WORK ON DIET - EAT MORE LEAN PROTEINS (CHICKEN, FISH, BEANS, TURKEY), FRUITS, VEGETABLES AND DRINK MORE WATER. EAT LESS RED MEAT, DAIRY PRODUCTS, STARCHY FOODS (POTATOES, RICE, PASTA, BREAD, TORTILLAS, CHIPS, COOKIES, CAKES), SWEETS AND DRINK LESS SODA, LESS ENERGY DRINKS, LESS JUICE AND SWEET TEA. TRY TO EAT THREE MEALS A DAY WITH SOME SORT OF PROTEIN AND TRY TO CUT BACK ON SNACKS (UNLESS IT IS HEALTHY - VEGGIES AND HUMMUS, ONE SERVING OF NUTS, ONE SERVING OF FRUIT, ETC). PAY ATTENTION TO SERVING SIZES ON THE PACKAGES SO YOU DO NOT EAT LARGER PORTIONS. Please try to do some form of aerobic exercise at least 3-4 times per week for about 20-30 minutes at a time. Aerobic exercise can include walking, hiking, jogging, swimming or using an elliptical machine. You can also do light weights or consider doing free exercises on your smart TV.   [employer] has multiple free exercise videos that include yoga, kickboxing, pilates, aerobics, etc.      I WILL SEE YOU AGAIN IN 3 MONTHS BUT PLEASE CALL WITH CONCERNS [phone]

## 2023-04-21 NOTE — ASSESSMENT & PLAN NOTE
Patient stopped taking his Prozac, states that he has some at home if he feels that he needs it. Does not currently want to start it again but wants us to keep him on the list in case he decides to restart. We will recheck in 3 months.

## 2023-04-21 NOTE — ASSESSMENT & PLAN NOTE
Denies any more syncopal episodes since earlier this month. Discussed importance of blood sugar control, will recheck in 3 months.

## 2023-04-22 LAB
EST. AVERAGE GLUCOSE BLD GHB EST-MCNC: 292 MG/DL
HBA1C MFR BLD: 11.8 % (ref 4.8–5.6)